# Patient Record
Sex: MALE | Race: WHITE | URBAN - METROPOLITAN AREA
[De-identification: names, ages, dates, MRNs, and addresses within clinical notes are randomized per-mention and may not be internally consistent; named-entity substitution may affect disease eponyms.]

---

## 2021-12-13 ENCOUNTER — HISTORICAL (OUTPATIENT)
Dept: ADMINISTRATIVE | Facility: HOSPITAL | Age: 71
End: 2021-12-13

## 2022-01-08 ENCOUNTER — OUTSIDE PLACE OF SERVICE (OUTPATIENT)
Dept: PULMONOLOGY | Facility: CLINIC | Age: 72
End: 2022-01-08
Payer: MEDICARE

## 2022-01-08 PROCEDURE — 99232 SBSQ HOSP IP/OBS MODERATE 35: CPT | Mod: ,,,

## 2022-01-08 PROCEDURE — 99232 PR SUBSEQUENT HOSPITAL CARE,LEVL II: ICD-10-PCS | Mod: ,,,

## 2022-04-10 ENCOUNTER — HISTORICAL (OUTPATIENT)
Dept: ADMINISTRATIVE | Facility: HOSPITAL | Age: 72
End: 2022-04-10
Payer: MEDICARE

## 2022-04-25 VITALS
SYSTOLIC BLOOD PRESSURE: 115 MMHG | BODY MASS INDEX: 22.77 KG/M2 | WEIGHT: 145.06 LBS | DIASTOLIC BLOOD PRESSURE: 80 MMHG | HEIGHT: 67 IN

## 2022-05-03 NOTE — HISTORICAL [facility] CERNER
This is a historical note converted from [place]. Formatting and pictures may have been removed.  Please reference [place] for original formatting and attached multimedia. Chief Complaint  3 WEEK F/U NONOP ANTERIOR COLUMN RIGHT ACETABULUM FX, SX 11/21/21, AMBULATING WITH WALKER, C/O PAIN MEDICATION NOT HELPING  History of Present Illness  Fracture  Patient is here today for follow-up evaluation of 3 weeks status post nonoperative management of right?acetabulum.? Patient states that his pain is controlled at rest. ?It does hurt whenever he tries to get up and get around.? He claims that he has been?compliant with his nonweightbearing status.  ?  Review of Systems  ?  Constitutional: negative except as stated in HPI  HEENT: negative except as stated in HPI  Respiratory: negative except as stated in HPI  Cardiovascular: negative except as stated in HPI  Gastrointestinal: negative except as stated in HPI  Genitourinary: negative except as stated in HPI  Heme/Lymph: negative except as stated in HPI  Musculoskeletal: negative except as stated in HPI  Integumentary: negative except as stated in HPI  Neurologic: negative except as stated in HPI  ?   All Other ROS_ negative except as stated in HPI  ?  Physical Exam  Vitals & Measurements  HR:?94(Peripheral)? RR:?18? BP:?115/80?  HT:?170.00?cm? WT:?65.800?kg? BMI:?22.77?  ?  GEN: Well appearing. Awake, alert and oriented. ?In no distress.  ?  HEENT: NCAT, EOMI  ?  CV: Normal rhythm, regular rate, normal peripheral perfusion  ?  PULM: Unlabored respirations with symmetric chest rise. No SOB noted  ?  ABD: Soft, nontender, nondistended  ?  Integument: Clean and dry, no open wounds or lesions  ?  Right lower extremity:?Soreness with internal/external rotation of the right hip.? No pain with full range of motion of the?knee ankle and digits.? Palpable DP pulse. ?5 out of 5 motor strength.? No calf swelling or tenderness, no signs of DVT.  ?  Assessment/Plan  1.?Closed nondisplaced  fracture of anterior column of right acetabulum?S32.434D  ?  Fracture remains amenable to continued closed treatment. ?He will need to remain [place]?his nonweightbearing status to the right lower extremity for an additional 6 weeks at which time we will begin a progressive weightbearing protocol.? At this discussion with him and his wife today they understand and agree. ?Follow-up 6 weeks repeat x-rays.  ?  This note/OR report was created with the assistance of Dragon voice recognition software or phone ?dictation. ?There may be transcription errors as a result of using this technology however minimal. Effort has been made to assure accuracy of transcription but any obvious errors or omissions should be clarified with the author of the document.?  ?  ?  Ordered:  Clinic Follow up, *Est. 01/24/22 3:00:00 CST, Order for future visit, Closed nondisplaced fracture of anterior column of right acetabulum, [facility]  Office/Outpatient Visit Level 3 Established [id number] PC, Closed nondisplaced fracture of anterior column of right acetabulum, [facility], 12/13/21 12:29:00 CST  XR Pelvis Minimum 3 Views, Routine, *Est. 01/24/22 3:00:00 CST, Trauma, None, Ambulatory, Rad Type, Order for future visit, Closed nondisplaced fracture of anterior column of right acetabulum, Not Scheduled, *Est. 01/24/22 3:00:00 CST  ?  Referrals  Clinic Follow up, *Est. 01/24/22 3:00:00 CST, Order for future visit, Closed nondisplaced fracture of anterior column of right acetabulum, [facility]   Problem List/Past Medical History  Ongoing  Closed nondisplaced fracture of anterior column of right acetabulum  Hypothyroid  Schizophrenia  Historical  HTN - Hypertension  Oropharyngeal dysphagia  Procedure/Surgical History  PEG - Percutaneous endoscopic gastrostomy   Medications  acetaminophen-hydrocodone 325 mg-5 mg oral tablet, 1 tab(s), Oral, q6hr  benztropine 0.5 mg oral tablet, 0.5 mg= 1 tab(s), Oral, Daily  benztropine 1 mg oral  tablet, 0.5 mg= 0.5 tab(s), Oral, Daily  Daliresp 500 mcg oral tablet, 500 mcg, Oral, Daily  DULoxetine 60 mg oral delayed release capsule, 60 mg= 1 cap(s), Oral, Daily  ferrous sulfate 325 mg (65 mg elemental iron) oral tablet, 325 mg, Oral, Daily  fluoxetine 20 mg oral capsule, 20 mg= 1 cap(s), Oral, Daily  FLUoxetine 40 mg oral capsule, 40 mg= 1 cap(s), Oral, Daily  gabapentin 300 mg oral capsule, 300 mg= 1 cap(s), Oral, TID  levothyroxine 150 mcg (0.15 mg) oral tablet, 150 mcg= 1 tab(s), Oral, Daily  LORazepam 0.5 mg oral tablet, 0.5 mg= 1 tab(s), Oral, BID  methocarbamol 500 mg oral tablet, 500 mg= 1 tab(s), Oral, QID  Metoprolol Tartrate 25 mg oral tablet, 12.5 mg= 0.5 tab(s), Oral, BID  montelukast 10 mg oral TABLET, 10 mg= 1 tab(s), Oral, Daily  oxycodone 5 mg oral tablet, 5 mg= 1 tab(s), Oral, QID  QUEtiapine 300 mg oral tablet, 300 mg= 1 tab(s), Oral, qPM  Risperdal 1 mg oral tablet, 2 mg= 2 tab(s), Oral, qPM  risperidone 2 mg oral tablet, 2 mg= 1 tab(s), Oral, qPM  Symbicort 80 mcg-4.5 mcg/inh inhalation aerosol, 2 puff(s), INH, BID  Synthroid 75 mcg (0.075 mg) oral tablet, 150 mcg= 2 tab(s), Oral, Daily  Allergies  codeine?(Itching)  Social History  Abuse/Neglect  No, 12/13/2021  No, 11/21/2021  Tobacco  Former smoker, quit more than 30 days ago, N/A, 12/13/2021  Former smoker, quit more than 30 days ago, No, 11/24/2021  Family History  Family history is negative  Immunizations  Vaccine Date Status   tuberculin purified protein derivative 11/24/2021 Given   Health Maintenance  Health Maintenance  ???Pending?(in the next year)  ??? ??OverDue  ??? ? ? ?Alcohol Misuse Screening due??01/02/21??and every 1??year(s)  ??? ? ? ?Cognitive Screening due??01/02/21??and every 1??year(s)  ??? ??Due?  ??? ? ? ?Aspirin Therapy for CVD Prevention due??12/13/21??and every 1??year(s)  ??? ? ? ?Medicare Annual Wellness Exam due??12/13/21??and every 1??year(s)  ??? ? ? ?Tetanus Vaccine due??12/13/21??and every  10??year(s)  ??? ??Due In Future?  ??? ? ? ?Obesity Screening not due until??01/01/22??and every 1??year(s)  ??? ? ? ?Advance Directive not due until??01/02/22??and every 1??year(s)  ??? ? ? ?Fall Risk Assessment not due until??01/02/22??and every 1??year(s)  ??? ? ? ?Functional Assessment not due until??01/02/22??and every 1??year(s)  ??? ? ? ?ADL Screening not due until??12/05/22??and every 1??year(s)  ???Satisfied?(in the past 1 year)  ??? ??Satisfied?  ??? ? ? ?ADL Screening on??12/05/21.??Satisfied by Denae Kelley  ??? ? ? ?Advance Directive on??12/02/21.??Satisfied by Yokasta Pompa LPN  ??? ? ? ?Blood Pressure Screening on??12/13/21.??Satisfied by Kristin Cui  ??? ? ? ?Body Mass Index Check on??12/13/21.??Satisfied by Kristin Cui  ??? ? ? ?Diabetes Screening on??12/06/21.??Satisfied by Jessica Gann  ??? ? ? ?Fall Risk Assessment on??12/13/21.??Satisfied by Kristin Cui  ??? ? ? ?Functional Assessment on??12/08/21.??Satisfied by David Guaman  ??? ? ? ?Influenza Vaccine on??11/24/21.??Satisfied by Dana Gonzalez RN  ??? ? ? ?Obesity Screening on??12/13/21.??Satisfied by Kristin Cui  ??? ? ? ?Pneumococcal Vaccine on??11/24/21.??Satisfied by Dana Gonzalez RN  ?  Diagnostic Results  Pelvis 3 views:?Alignment unchanged compared to previous films. ?Remains amenable to continued close management.